# Patient Record
Sex: FEMALE | Race: WHITE | Employment: FULL TIME | URBAN - METROPOLITAN AREA
[De-identification: names, ages, dates, MRNs, and addresses within clinical notes are randomized per-mention and may not be internally consistent; named-entity substitution may affect disease eponyms.]

---

## 2017-07-05 ENCOUNTER — APPOINTMENT (OUTPATIENT)
Dept: GENERAL RADIOLOGY | Age: 42
End: 2017-07-05
Attending: NURSE PRACTITIONER
Payer: SELF-PAY

## 2017-07-05 ENCOUNTER — HOSPITAL ENCOUNTER (EMERGENCY)
Age: 42
Discharge: HOME | End: 2017-07-05
Attending: EMERGENCY MEDICINE
Payer: SELF-PAY

## 2017-07-05 VITALS
HEART RATE: 64 BPM | DIASTOLIC BLOOD PRESSURE: 51 MMHG | WEIGHT: 175 LBS | BODY MASS INDEX: 31.01 KG/M2 | RESPIRATION RATE: 16 BRPM | SYSTOLIC BLOOD PRESSURE: 116 MMHG | TEMPERATURE: 98.4 F | HEIGHT: 63 IN | OXYGEN SATURATION: 99 %

## 2017-07-05 DIAGNOSIS — L03.115 CELLULITIS OF RIGHT LOWER EXTREMITY: Primary | ICD-10-CM

## 2017-07-05 PROCEDURE — 73610 X-RAY EXAM OF ANKLE: CPT

## 2017-07-05 PROCEDURE — 74011250637 HC RX REV CODE- 250/637: Performed by: NURSE PRACTITIONER

## 2017-07-05 PROCEDURE — 73630 X-RAY EXAM OF FOOT: CPT

## 2017-07-05 PROCEDURE — 99283 EMERGENCY DEPT VISIT LOW MDM: CPT | Performed by: NURSE PRACTITIONER

## 2017-07-05 RX ORDER — CEPHALEXIN 500 MG/1
500 CAPSULE ORAL 4 TIMES DAILY
Qty: 28 CAP | Refills: 0 | Status: SHIPPED | OUTPATIENT
Start: 2017-07-05 | End: 2017-07-08

## 2017-07-05 RX ORDER — TRAMADOL HYDROCHLORIDE 50 MG/1
50 TABLET ORAL
Status: COMPLETED | OUTPATIENT
Start: 2017-07-05 | End: 2017-07-05

## 2017-07-05 RX ADMIN — TRAMADOL HYDROCHLORIDE 50 MG: 50 TABLET, FILM COATED ORAL at 18:34

## 2017-07-05 NOTE — ED TRIAGE NOTES
Pt c/o right foot pain x 2 days. Pt states she woke with the foot pain. Pt denies any trauma to the area. States today she is unable to stand. No obvious swelling or deformity noted at this time. Pt alert and oriented x 4. Respirations are even and unlabored. Pt appears in no acute distress at this time.

## 2017-07-05 NOTE — DISCHARGE INSTRUCTIONS

## 2017-07-05 NOTE — ED PROVIDER NOTES
HPI Comments: Patient presents with pain and redness to her right foot. She denies injury. She has noted redness to her right foot. Patient states pain increase with movement and contact. Patient is a 46 y.o. female presenting with foot pain. The history is provided by the patient. Foot Pain    This is a new problem. The current episode started 2 days ago. The problem occurs constantly. The problem has been gradually worsening. The pain is present in the right foot. The quality of the pain is described as aching. The pain is at a severity of 7/10. The pain is mild. Pertinent negatives include no numbness, full range of motion, no stiffness, no tingling, no itching, no back pain and no neck pain. The symptoms are aggravated by standing, movement and palpation. She has tried nothing for the symptoms. There has been no history of extremity trauma. Past Medical History:   Diagnosis Date    Cancer [facility])     breast    Ill-defined condition     ovarian cysts    Seizures (HCC)        Past Surgical History:   Procedure Laterality Date    HX CHOLECYSTECTOMY      HX GYN      c/s-bladder tac-partial hysterectomy         History reviewed. No pertinent family history. Social History     Social History    Marital status: SINGLE     Spouse name: N/A    Number of children: N/A    Years of education: N/A     Occupational History    Not on file. Social History Main Topics    Smoking status: Current Every Day Smoker     Packs/day: 0.50    Smokeless tobacco: Not on file    Alcohol use No    Drug use: No    Sexual activity: Not on file     Other Topics Concern    Not on file     Social History Narrative    ** Merged History Encounter **              ALLERGIES: Motrin [ibuprofen] and Tylenol [acetaminophen]    Review of Systems   Constitutional: Negative for chills and fever. Respiratory: Negative for cough and shortness of breath. Cardiovascular: Negative for chest pain.    Gastrointestinal: Negative for abdominal pain. Musculoskeletal: Positive for arthralgias and joint swelling. Negative for back pain, neck pain and stiffness. Skin: Positive for color change. Negative for itching. Neurological: Negative for tingling and numbness. Vitals:    07/05/17 1556   BP: 128/55   Pulse: 78   Resp: 17   Temp: 98 °F (36.7 °C)   SpO2: 98%   Weight: 79.4 kg (175 lb)   Height: 5' 3\" (1.6 m)            Physical Exam   Constitutional: She is oriented to person, place, and time. She appears well-developed and well-nourished. No distress. Cardiovascular: Normal rate and regular rhythm. No murmur heard. Pulmonary/Chest: Effort normal and breath sounds normal.   Musculoskeletal:        Right ankle: She exhibits swelling. She exhibits no deformity, no laceration and normal pulse. Tenderness. Lateral malleolus tenderness found. Right lower leg: Normal.        Right foot: There is tenderness and swelling. There is normal capillary refill, no deformity and no laceration. Neurological: She is alert and oriented to person, place, and time. Skin: Skin is warm and dry. She is not diaphoretic. There is erythema. Psychiatric: She has a normal mood and affect. Her behavior is normal.   Nursing note and vitals reviewed.       XR ANKLE RT MIN 3 V (Final result) Result time: 07/05/17 18:07:33     Final result by Zacarias Seip, MD (07/05/17 18:07:33)     Impression:     IMPRESSION: No acute osseous abnormality.     Narrative:     Right ankle  Right foot    Clinical indication: Acute moderate to severe swelling and pain right ankle and  right foot with difficulty walking, no known injury     Comparison: none    Technique: 3 views right foot, 3 views right ankle    Findings: The bone density is overall normal. There are no abnormal radiopaque  foreign bodies or dissecting subcutaneous gas.     Right foot: There is no evidence of fracture, dislocation, or erosion.  Overall  preservation of joint spaces and alignment. Small areas of spurring and cortical  irregularity of the calcaneus are chronic in appearance, possibly due to  previous injury or inflammation. Right ankle: No evidence of fracture, dislocation, erosion, or focal aggressive  osseous lesion. Mild soft tissue swelling.                   XR FOOT RT MIN 3 V (Final result) Result time: 07/05/17 18:07:33     Final result by Fani Patino MD (07/05/17 18:07:33)     Impression:     IMPRESSION: No acute osseous abnormality.     Narrative:     Right ankle  Right foot    Clinical indication: Acute moderate to severe swelling and pain right ankle and  right foot with difficulty walking, no known injury     Comparison: none    Technique: 3 views right foot, 3 views right ankle    Findings: The bone density is overall normal. There are no abnormal radiopaque  foreign bodies or dissecting subcutaneous gas. Right foot: There is no evidence of fracture, dislocation, or erosion.  Overall  preservation of joint spaces and alignment. Small areas of spurring and cortical  irregularity of the calcaneus are chronic in appearance, possibly due to  previous injury or inflammation. Right ankle: No evidence of fracture, dislocation, erosion, or focal aggressive  osseous lesion. Mild soft tissue swelling. MDM  Number of Diagnoses or Management Options  Cellulitis of right lower extremity:   Diagnosis management comments: No acute abnormality noted on xray. Patient given prescription for keflex.         Amount and/or Complexity of Data Reviewed  Tests in the radiology section of CPT®: ordered and reviewed    Patient Progress  Patient progress: stable    ED Course       Procedures

## 2017-07-05 NOTE — LETTER
[address] [facility] - [place], [place] EMERGENCY DEPT One [address] [id number] 
[phone] Work/School Note Date: 7/5/2017 To Whom It May concern: 
 
Whitney Sanchez was seen and treated today in the emergency room by the following provider(s): 
Attending Provider: Colton Solitario MD 
Nurse Practitioner: FIGUEROA Villar. Whitney Sanchez may return to work on Friday, July 07, 2017.  
 
Sincerely, 
 
 
 
 
Cozetta Rinne, RN

## 2017-07-08 ENCOUNTER — HOSPITAL ENCOUNTER (EMERGENCY)
Age: 42
Discharge: HOME | End: 2017-07-08
Attending: EMERGENCY MEDICINE
Payer: SELF-PAY

## 2017-07-08 VITALS
SYSTOLIC BLOOD PRESSURE: 142 MMHG | DIASTOLIC BLOOD PRESSURE: 70 MMHG | OXYGEN SATURATION: 98 % | TEMPERATURE: 98.5 F | HEART RATE: 92 BPM | WEIGHT: 170 LBS | BODY MASS INDEX: 30.12 KG/M2 | RESPIRATION RATE: 18 BRPM | HEIGHT: 63 IN

## 2017-07-08 DIAGNOSIS — L03.90 CELLULITIS, UNSPECIFIED CELLULITIS SITE: Primary | ICD-10-CM

## 2017-07-08 LAB
ALBUMIN SERPL BCP-MCNC: 2.9 G/DL (ref 3.5–5)
ALBUMIN/GLOB SERPL: 0.5 {RATIO} (ref 1.2–3.5)
ALP SERPL-CCNC: 198 U/L (ref 50–136)
ALT SERPL-CCNC: 95 U/L (ref 12–65)
ANION GAP BLD CALC-SCNC: 10 MMOL/L (ref 7–16)
AST SERPL W P-5'-P-CCNC: 111 U/L (ref 15–37)
BASOPHILS # BLD AUTO: 0 K/UL (ref 0–0.2)
BASOPHILS # BLD: 0 % (ref 0–2)
BILIRUB SERPL-MCNC: 1.6 MG/DL (ref 0.2–1.1)
BUN SERPL-MCNC: 11 MG/DL (ref 6–23)
CALCIUM SERPL-MCNC: 8.6 MG/DL (ref 8.3–10.4)
CHLORIDE SERPL-SCNC: 104 MMOL/L (ref 98–107)
CO2 SERPL-SCNC: 25 MMOL/L (ref 21–32)
CREAT SERPL-MCNC: 0.79 MG/DL (ref 0.6–1)
DIFFERENTIAL METHOD BLD: ABNORMAL
EOSINOPHIL # BLD: 0 K/UL (ref 0–0.8)
EOSINOPHIL NFR BLD: 1 % (ref 0.5–7.8)
ERYTHROCYTE [DISTWIDTH] IN BLOOD BY AUTOMATED COUNT: 14.9 % (ref 11.9–14.6)
GLOBULIN SER CALC-MCNC: 6.2 G/DL (ref 2.3–3.5)
GLUCOSE SERPL-MCNC: 165 MG/DL (ref 65–100)
HCT VFR BLD AUTO: 36.7 % (ref 35.8–46.3)
HGB BLD-MCNC: 12.2 G/DL (ref 11.7–15.4)
IMM GRANULOCYTES # BLD: 0 K/UL (ref 0–0.5)
IMM GRANULOCYTES NFR BLD AUTO: 0.2 % (ref 0–5)
LACTATE BLD-SCNC: 1.5 MMOL/L (ref 0.5–1.9)
LACTATE BLD-SCNC: 2.5 MMOL/L (ref 0.5–1.9)
LYMPHOCYTES # BLD AUTO: 40 % (ref 13–44)
LYMPHOCYTES # BLD: 2.1 K/UL (ref 0.5–4.6)
MCH RBC QN AUTO: 28.2 PG (ref 26.1–32.9)
MCHC RBC AUTO-ENTMCNC: 33.2 G/DL (ref 31.4–35)
MCV RBC AUTO: 85 FL (ref 79.6–97.8)
MONOCYTES # BLD: 0.6 K/UL (ref 0.1–1.3)
MONOCYTES NFR BLD AUTO: 11 % (ref 4–12)
NEUTS SEG # BLD: 2.5 K/UL (ref 1.7–8.2)
NEUTS SEG NFR BLD AUTO: 48 % (ref 43–78)
PLATELET # BLD AUTO: 105 K/UL (ref 150–450)
PMV BLD AUTO: 13 FL (ref 10.8–14.1)
POTASSIUM SERPL-SCNC: 3.1 MMOL/L (ref 3.5–5.1)
PROT SERPL-MCNC: 9.1 G/DL (ref 6.3–8.2)
RBC # BLD AUTO: 4.32 M/UL (ref 4.05–5.25)
SODIUM SERPL-SCNC: 139 MMOL/L (ref 136–145)
URATE SERPL-MCNC: 4.7 MG/DL (ref 2.6–6)
WBC # BLD AUTO: 5.2 K/UL (ref 4.3–11.1)

## 2017-07-08 PROCEDURE — 83605 ASSAY OF LACTIC ACID: CPT

## 2017-07-08 PROCEDURE — 96375 TX/PRO/DX INJ NEW DRUG ADDON: CPT | Performed by: PHYSICIAN ASSISTANT

## 2017-07-08 PROCEDURE — 96365 THER/PROPH/DIAG IV INF INIT: CPT | Performed by: PHYSICIAN ASSISTANT

## 2017-07-08 PROCEDURE — 96372 THER/PROPH/DIAG INJ SC/IM: CPT | Performed by: PHYSICIAN ASSISTANT

## 2017-07-08 PROCEDURE — 80053 COMPREHEN METABOLIC PANEL: CPT | Performed by: EMERGENCY MEDICINE

## 2017-07-08 PROCEDURE — 84550 ASSAY OF BLOOD/URIC ACID: CPT | Performed by: EMERGENCY MEDICINE

## 2017-07-08 PROCEDURE — 99284 EMERGENCY DEPT VISIT MOD MDM: CPT | Performed by: PHYSICIAN ASSISTANT

## 2017-07-08 PROCEDURE — 85025 COMPLETE CBC W/AUTO DIFF WBC: CPT | Performed by: EMERGENCY MEDICINE

## 2017-07-08 PROCEDURE — 74011250636 HC RX REV CODE- 250/636: Performed by: PHYSICIAN ASSISTANT

## 2017-07-08 RX ORDER — CLINDAMYCIN PHOSPHATE 900 MG/50ML
900 INJECTION INTRAVENOUS
Status: COMPLETED | OUTPATIENT
Start: 2017-07-08 | End: 2017-07-08

## 2017-07-08 RX ORDER — HYDROCODONE BITARTRATE AND IBUPROFEN 5; 200 MG/1; MG/1
1 TABLET ORAL
Qty: 10 TAB | Refills: 0 | Status: SHIPPED | OUTPATIENT
Start: 2017-07-08

## 2017-07-08 RX ORDER — CLINDAMYCIN HYDROCHLORIDE 150 MG/1
300 CAPSULE ORAL EVERY 6 HOURS
Qty: 80 CAP | Refills: 0 | Status: SHIPPED | OUTPATIENT
Start: 2017-07-08 | End: 2017-07-18

## 2017-07-08 RX ORDER — HYDROMORPHONE HYDROCHLORIDE 1 MG/ML
1 INJECTION, SOLUTION INTRAMUSCULAR; INTRAVENOUS; SUBCUTANEOUS
Status: COMPLETED | OUTPATIENT
Start: 2017-07-08 | End: 2017-07-08

## 2017-07-08 RX ORDER — KETOROLAC TROMETHAMINE 30 MG/ML
30 INJECTION, SOLUTION INTRAMUSCULAR; INTRAVENOUS
Status: DISCONTINUED | OUTPATIENT
Start: 2017-07-08 | End: 2017-07-08

## 2017-07-08 RX ORDER — ONDANSETRON 2 MG/ML
4 INJECTION INTRAMUSCULAR; INTRAVENOUS
Status: COMPLETED | OUTPATIENT
Start: 2017-07-08 | End: 2017-07-08

## 2017-07-08 RX ORDER — KETOROLAC TROMETHAMINE 30 MG/ML
60 INJECTION, SOLUTION INTRAMUSCULAR; INTRAVENOUS
Status: COMPLETED | OUTPATIENT
Start: 2017-07-08 | End: 2017-07-08

## 2017-07-08 RX ORDER — PROMETHAZINE HYDROCHLORIDE 25 MG/ML
25 INJECTION, SOLUTION INTRAMUSCULAR; INTRAVENOUS
Status: COMPLETED | OUTPATIENT
Start: 2017-07-08 | End: 2017-07-08

## 2017-07-08 RX ADMIN — SODIUM CHLORIDE 1000 ML: 900 INJECTION, SOLUTION INTRAVENOUS at 18:55

## 2017-07-08 RX ADMIN — CLINDAMYCIN PHOSPHATE 900 MG: 18 INJECTION, SOLUTION INTRAMUSCULAR; INTRAVENOUS at 18:59

## 2017-07-08 RX ADMIN — PROMETHAZINE HYDROCHLORIDE 25 MG: 25 INJECTION INTRAMUSCULAR; INTRAVENOUS at 16:51

## 2017-07-08 RX ADMIN — KETOROLAC TROMETHAMINE 60 MG: 30 INJECTION, SOLUTION INTRAMUSCULAR at 16:51

## 2017-07-08 RX ADMIN — HYDROMORPHONE HYDROCHLORIDE 1 MG: 1 INJECTION, SOLUTION INTRAMUSCULAR; INTRAVENOUS; SUBCUTANEOUS at 18:55

## 2017-07-08 RX ADMIN — ONDANSETRON 4 MG: 2 INJECTION INTRAMUSCULAR; INTRAVENOUS at 18:55

## 2017-07-08 NOTE — DISCHARGE INSTRUCTIONS

## 2017-07-08 NOTE — ED TRIAGE NOTES
Pt. With swelling/redness to right foot/ankle. Here 3 days ago and treated for the same cellulitis. States the swelling has increased, despite being on abx.

## 2017-07-08 NOTE — ED PROVIDER NOTES
HPI Comments: Patient states that she woke up on July 5, 3 days ago with pain,, swelling and redness to her right ankle. She came to the emergency room, had an x-ray and was told that it appeared to be cellulitis. She was started on Keflex which she states she has been taking. The pain, redness and swelling has gotten worse. She has been taking the antibiotics as instructed. No fever. She does not have a history of gout. No chest pain, shortness of breath, abdominal pain, nausea, vomiting or injury to the area. No swelling of the leg. Patient does have a history of MRSA. Patient is tearful because of the pain. Patient is a 46 y.o. female presenting with foot swelling. The history is provided by the patient. Foot Swelling    This is a new problem. Episode onset: 07/05/17, 3 days now. The problem occurs constantly. The problem has been gradually worsening. The pain is present in the right foot. The quality of the pain is described as aching. The pain is at a severity of 7/10. The pain is moderate. Associated symptoms include limited range of motion and stiffness. Pertinent negatives include no numbness, no tingling, no itching, no back pain and no neck pain. The symptoms are aggravated by movement, activity and standing. She has tried nothing for the symptoms. There has been no history of extremity trauma. Past Medical History:   Diagnosis Date    Cancer [facility])     breast    Ill-defined condition     ovarian cysts    Seizures (HCC)        Past Surgical History:   Procedure Laterality Date    HX CHOLECYSTECTOMY      HX GYN      c/s-bladder tac-partial hysterectomy         No family history on file. Social History     Social History    Marital status: SINGLE     Spouse name: N/A    Number of children: N/A    Years of education: N/A     Occupational History    Not on file.      Social History Main Topics    Smoking status: Current Every Day Smoker     Packs/day: 0.50    Smokeless tobacco: Never Used    Alcohol use No    Drug use: No    Sexual activity: Not on file     Other Topics Concern    Not on file     Social History Narrative    ** Merged History Encounter **              ALLERGIES: Motrin [ibuprofen] and Tylenol [acetaminophen]    Review of Systems   Constitutional: Negative. HENT: Negative. Eyes: Negative. Respiratory: Negative. Cardiovascular: Negative. Gastrointestinal: Negative. Genitourinary: Negative. Musculoskeletal: Positive for stiffness. Negative for back pain and neck pain. Right ankle pain   Skin: Negative. Negative for itching. Neurological: Negative. Negative for tingling and numbness. Psychiatric/Behavioral: Negative. All other systems reviewed and are negative. Vitals:    07/08/17 1612   BP: 152/65   Pulse: (!) 107   Resp: 18   Temp: 98.3 °F (36.8 °C)   SpO2: 99%   Weight: 77.1 kg (170 lb)   Height: 5' 3\" (1.6 m)            Physical Exam   Constitutional: She is oriented to person, place, and time. She appears well-developed and well-nourished. HENT:   Head: Normocephalic and atraumatic. Right Ear: External ear normal.   Left Ear: External ear normal.   Nose: Nose normal.   Mouth/Throat: Oropharynx is clear and moist.   Eyes: Conjunctivae and EOM are normal. Pupils are equal, round, and reactive to light. Neck: Normal range of motion. Neck supple. Cardiovascular: Normal rate, regular rhythm, normal heart sounds and intact distal pulses. Pulmonary/Chest: Effort normal and breath sounds normal.   Abdominal: Soft. Bowel sounds are normal.   Musculoskeletal:        Right ankle: Tenderness. Feet:    Neurological: She is alert and oriented to person, place, and time. She has normal reflexes. Skin: Skin is warm and dry. Psychiatric: She has a normal mood and affect. Her behavior is normal. Judgment and thought content normal.   Nursing note and vitals reviewed.        MDM  Number of Diagnoses or Management Options Amount and/or Complexity of Data Reviewed  Clinical lab tests: ordered and reviewed  Tests in the radiology section of CPT®: reviewed (XR from 07/05/17)    Risk of Complications, Morbidity, and/or Mortality  Presenting problems: moderate  Diagnostic procedures: moderate  Management options: moderate    Patient Progress  Patient progress: stable    ED Course       Procedures      4:34 PM Spoke with Dr. Sara Garcia regarding patient. 6:12 PM Spoke with Dr. Timo Hu regarding patient. 6:23 PM Spoke with patient, she will consent to IVF, IV clindamycin and IV pain medication. We will repeat a lactic acid after the IVF. If better, since she is not vomiting, she can go home. She does not have insurance and can fill the clindamycin at [facility] for $4. Return to the ED if worse. The patient was observed in the ED. Results Reviewed:      Recent Results (from the past 24 hour(s))   CBC WITH AUTOMATED DIFF    Collection Time: 07/08/17  4:49 PM   Result Value Ref Range    WBC 5.2 4.3 - 11.1 K/uL    RBC 4.32 4.05 - 5.25 M/uL    HGB 12.2 11.7 - 15.4 g/dL    HCT 36.7 35.8 - 46.3 %    MCV 85.0 79.6 - 97.8 FL    MCH 28.2 26.1 - 32.9 PG    MCHC 33.2 31.4 - 35.0 g/dL    RDW 14.9 (H) 11.9 - 14.6 %    PLATELET 596 (L) 412 - 450 K/uL    MPV 13.0 10.8 - 14.1 FL    DF AUTOMATED      NEUTROPHILS 48 43 - 78 %    LYMPHOCYTES 40 13 - 44 %    MONOCYTES 11 4.0 - 12.0 %    EOSINOPHILS 1 0.5 - 7.8 %    BASOPHILS 0 0.0 - 2.0 %    IMMATURE GRANULOCYTES 0.2 0.0 - 5.0 %    ABS. NEUTROPHILS 2.5 1.7 - 8.2 K/UL    ABS. LYMPHOCYTES 2.1 0.5 - 4.6 K/UL    ABS. MONOCYTES 0.6 0.1 - 1.3 K/UL    ABS. EOSINOPHILS 0.0 0.0 - 0.8 K/UL    ABS. BASOPHILS 0.0 0.0 - 0.2 K/UL    ABS. IMM.  GRANS. 0.0 0.0 - 0.5 K/UL   METABOLIC PANEL, COMPREHENSIVE    Collection Time: 07/08/17  4:49 PM   Result Value Ref Range    Sodium 139 136 - 145 mmol/L    Potassium 3.1 (L) 3.5 - 5.1 mmol/L    Chloride 104 98 - 107 mmol/L    CO2 25 21 - 32 mmol/L    Anion gap 10 7 - 16 mmol/L    Glucose 165 (H) 65 - 100 mg/dL    BUN 11 6 - 23 MG/DL    Creatinine 0.79 0.6 - 1.0 MG/DL    GFR est AA >60 >60 ml/min/1.73m2    GFR est non-AA >60 >60 ml/min/1.73m2    Calcium 8.6 8.3 - 10.4 MG/DL    Bilirubin, total 1.6 (H) 0.2 - 1.1 MG/DL    ALT (SGPT) 95 (H) 12 - 65 U/L    AST (SGOT) 111 (H) 15 - 37 U/L    Alk. phosphatase 198 (H) 50 - 136 U/L    Protein, total 9.1 (H) 6.3 - 8.2 g/dL    Albumin 2.9 (L) 3.5 - 5.0 g/dL    Globulin 6.2 (H) 2.3 - 3.5 g/dL    A-G Ratio 0.5 (L) 1.2 - 3.5     URIC ACID    Collection Time: 07/08/17  4:49 PM   Result Value Ref Range    Uric acid 4.7 2.6 - 6.0 MG/DL   POC LACTIC ACID    Collection Time: 07/08/17  5:02 PM   Result Value Ref Range    Lactic Acid (POC) 2.5 (H) 0.5 - 1.9 mmol/L     7:01 PM Care transferred to Dr. Karen Ruiz at shift change. He will recheck POC lactic acid after IVF, if ok, she will go home. I discussed the results of all labs, procedures, radiographs, and treatments with the patient and available family. Treatment plan is agreed upon and the patient is ready for discharge. All voiced understanding of the discharge plan and medication instructions or changes as appropriate. Questions about treatment in the ED were answered. All were encouraged to return should symptoms worsen or new problems develop.

## 2017-07-08 NOTE — Clinical Note
Drink plenty of fluids, start the clindamycin tonight and finish all of it. Return to the ED if worse.

## 2017-07-09 ENCOUNTER — HOSPITAL ENCOUNTER (EMERGENCY)
Age: 42
Discharge: HOME | End: 2017-07-10
Attending: EMERGENCY MEDICINE
Payer: SELF-PAY

## 2017-07-09 DIAGNOSIS — L03.115 CELLULITIS OF RIGHT LOWER EXTREMITY: Primary | ICD-10-CM

## 2017-07-09 LAB
ANION GAP BLD CALC-SCNC: 8 MMOL/L (ref 7–16)
BASOPHILS # BLD AUTO: 0 K/UL (ref 0–0.2)
BASOPHILS # BLD: 0 % (ref 0–2)
BUN SERPL-MCNC: 9 MG/DL (ref 6–23)
CALCIUM SERPL-MCNC: 8.4 MG/DL (ref 8.3–10.4)
CHLORIDE SERPL-SCNC: 101 MMOL/L (ref 98–107)
CO2 SERPL-SCNC: 29 MMOL/L (ref 21–32)
CREAT SERPL-MCNC: 0.74 MG/DL (ref 0.6–1)
DIFFERENTIAL METHOD BLD: ABNORMAL
EOSINOPHIL # BLD: 0 K/UL (ref 0–0.8)
EOSINOPHIL NFR BLD: 1 % (ref 0.5–7.8)
ERYTHROCYTE [DISTWIDTH] IN BLOOD BY AUTOMATED COUNT: 14.7 % (ref 11.9–14.6)
GLUCOSE SERPL-MCNC: 93 MG/DL (ref 65–100)
HCT VFR BLD AUTO: 34.5 % (ref 35.8–46.3)
HGB BLD-MCNC: 11.7 G/DL (ref 11.7–15.4)
IMM GRANULOCYTES # BLD: 0 K/UL (ref 0–0.5)
IMM GRANULOCYTES NFR BLD AUTO: 0 % (ref 0–5)
LYMPHOCYTES # BLD AUTO: 41 % (ref 13–44)
LYMPHOCYTES # BLD: 1.6 K/UL (ref 0.5–4.6)
MCH RBC QN AUTO: 28.8 PG (ref 26.1–32.9)
MCHC RBC AUTO-ENTMCNC: 33.9 G/DL (ref 31.4–35)
MCV RBC AUTO: 85 FL (ref 79.6–97.8)
MONOCYTES # BLD: 0.3 K/UL (ref 0.1–1.3)
MONOCYTES NFR BLD AUTO: 8 % (ref 4–12)
NEUTS SEG # BLD: 1.9 K/UL (ref 1.7–8.2)
NEUTS SEG NFR BLD AUTO: 50 % (ref 43–78)
PLATELET # BLD AUTO: 84 K/UL (ref 150–450)
PMV BLD AUTO: 11.5 FL (ref 10.8–14.1)
POTASSIUM SERPL-SCNC: 3.7 MMOL/L (ref 3.5–5.1)
RBC # BLD AUTO: 4.06 M/UL (ref 4.05–5.25)
SODIUM SERPL-SCNC: 138 MMOL/L (ref 136–145)
WBC # BLD AUTO: 3.8 K/UL (ref 4.3–11.1)

## 2017-07-09 PROCEDURE — 87040 BLOOD CULTURE FOR BACTERIA: CPT | Performed by: EMERGENCY MEDICINE

## 2017-07-09 PROCEDURE — 74011250636 HC RX REV CODE- 250/636: Performed by: EMERGENCY MEDICINE

## 2017-07-09 PROCEDURE — 96375 TX/PRO/DX INJ NEW DRUG ADDON: CPT | Performed by: EMERGENCY MEDICINE

## 2017-07-09 PROCEDURE — 96365 THER/PROPH/DIAG IV INF INIT: CPT | Performed by: EMERGENCY MEDICINE

## 2017-07-09 PROCEDURE — 99283 EMERGENCY DEPT VISIT LOW MDM: CPT | Performed by: EMERGENCY MEDICINE

## 2017-07-09 PROCEDURE — 96361 HYDRATE IV INFUSION ADD-ON: CPT | Performed by: EMERGENCY MEDICINE

## 2017-07-09 PROCEDURE — 85025 COMPLETE CBC W/AUTO DIFF WBC: CPT | Performed by: EMERGENCY MEDICINE

## 2017-07-09 PROCEDURE — 80048 BASIC METABOLIC PNL TOTAL CA: CPT | Performed by: EMERGENCY MEDICINE

## 2017-07-09 RX ORDER — HYDROMORPHONE HYDROCHLORIDE 1 MG/ML
0.5 INJECTION, SOLUTION INTRAMUSCULAR; INTRAVENOUS; SUBCUTANEOUS
Status: COMPLETED | OUTPATIENT
Start: 2017-07-09 | End: 2017-07-10

## 2017-07-09 RX ORDER — CLINDAMYCIN PHOSPHATE 900 MG/50ML
900 INJECTION INTRAVENOUS
Status: COMPLETED | OUTPATIENT
Start: 2017-07-09 | End: 2017-07-10

## 2017-07-09 RX ADMIN — SODIUM CHLORIDE 1000 ML: 900 INJECTION, SOLUTION INTRAVENOUS at 22:21

## 2017-07-09 NOTE — LETTER
[address] [facility] EMERGENCY DEPT One [address] [id number] [phone] Work/School Note Date: 7/9/2017 To Whom It May concern: 
 
Jessie Valladares was seen and treated today in the emergency room by the following provider(s): 
Attending Provider: Gustave Kanner, MD.   
 
Jessie Valladares Special Instructions: Work excuse next 2-3 days Sincerely, 
 
 
 
 
Gustave Kanner, MD

## 2017-07-10 VITALS
RESPIRATION RATE: 17 BRPM | SYSTOLIC BLOOD PRESSURE: 119 MMHG | HEART RATE: 71 BPM | TEMPERATURE: 99.1 F | DIASTOLIC BLOOD PRESSURE: 66 MMHG | WEIGHT: 170 LBS | OXYGEN SATURATION: 99 % | BODY MASS INDEX: 29.02 KG/M2 | HEIGHT: 64 IN

## 2017-07-10 PROCEDURE — 74011250636 HC RX REV CODE- 250/636: Performed by: EMERGENCY MEDICINE

## 2017-07-10 RX ADMIN — HYDROMORPHONE HYDROCHLORIDE 0.5 MG: 1 INJECTION, SOLUTION INTRAMUSCULAR; INTRAVENOUS; SUBCUTANEOUS at 00:28

## 2017-07-10 RX ADMIN — CLINDAMYCIN PHOSPHATE 900 MG: 18 INJECTION, SOLUTION INTRAMUSCULAR; INTRAVENOUS at 00:27

## 2017-07-10 NOTE — ED TRIAGE NOTES
patient states that she was given prescriptions here last night and they were too expensive for her to get filled. Also states the redness in her foot has gotten worse.

## 2017-07-10 NOTE — DISCHARGE INSTRUCTIONS

## 2017-07-10 NOTE — ED PROVIDER NOTES
HPI Comments: Here with some progressive swelling to the area that she was seen for yesterday. Unfortunately after leaving should not fill her prescriptions due to cost.  Foot is somewhat more swollen and slightly redder today than it was. Initial area of soreness as well as to the lateral fifth MTP region. She is not diabetic. No other sores or lesions present. Patient is a 46 y.o. female presenting with skin infection. The history is provided by the patient. Skin Infection   The current episode started more than 2 days ago. The problem occurs constantly. The problem has been gradually worsening. She has tried nothing (not able to fill prescription) for the symptoms. Past Medical History:   Diagnosis Date    Cancer [facility])     breast    Ill-defined condition     ovarian cysts    Seizures (HCC)        Past Surgical History:   Procedure Laterality Date    HX CHOLECYSTECTOMY      HX GYN      c/s-bladder tac-partial hysterectomy         History reviewed. No pertinent family history. Social History     Social History    Marital status: SINGLE     Spouse name: N/A    Number of children: N/A    Years of education: N/A     Occupational History    Not on file. Social History Main Topics    Smoking status: Current Every Day Smoker     Packs/day: 0.50    Smokeless tobacco: Never Used    Alcohol use No    Drug use: No    Sexual activity: Not on file     Other Topics Concern    Not on file     Social History Narrative    ** Merged History Encounter **              ALLERGIES: Motrin [ibuprofen] and Tylenol [acetaminophen]    Review of Systems   Constitutional: Positive for fatigue. Respiratory: Negative. Cardiovascular: Negative. Genitourinary: Negative. All other systems reviewed and are negative.       Vitals:    07/09/17 2140   BP: 128/73   Pulse: 82   Resp: 22   Temp: 99.8 °F (37.7 °C)   SpO2: 99%   Weight: 77.1 kg (170 lb)   Height: 5' 4\" (1.626 m)            Physical Exam Constitutional: She appears well-developed and well-nourished. No distress. Not toxic or septic   HENT:   Head: Atraumatic. Eyes: No scleral icterus. Neck: Neck supple. Cardiovascular: Normal rate. Pulmonary/Chest: Effort normal. No respiratory distress. Abdominal: Soft. Musculoskeletal: She exhibits edema. She exhibits no deformity. Neurological: She is alert. Skin: Skin is warm and dry. There is erythema. Pinkness to dorsum of right foot    Psychiatric: Thought content normal.   Nursing note and vitals reviewed. MDM  Number of Diagnoses or Management Options  Cellulitis of right lower extremity:   Diagnosis management comments: Soft tissue infection to the foot. subjective to patient somewhat worse in our department and actually after keeping it elevated getting IV antibiotics almost no visible changes are identified. Patient does not appear to be toxic or septic. Unfortunately was unable to fill her medications once again give her some IV antibiotics and also Her with our social workers. CBC normal.  Temperature low-grade       Amount and/or Complexity of Data Reviewed  Clinical lab tests: reviewed and ordered  Decide to obtain previous medical records or to obtain history from someone other than the patient: yes    Risk of Complications, Morbidity, and/or Mortality  Presenting problems: moderate  Diagnostic procedures: low  Management options: moderate    Patient Progress  Patient progress: stable    ED Course       Procedures

## 2017-07-14 LAB
BACTERIA SPEC CULT: NORMAL
BACTERIA SPEC CULT: NORMAL
SERVICE CMNT-IMP: NORMAL
SERVICE CMNT-IMP: NORMAL

## 2018-02-01 LAB
BASOPHILS # BLD: 0 K/UL (ref 0–0.2)
BASOPHILS NFR BLD: 0 % (ref 0–2)
DIFFERENTIAL METHOD BLD: ABNORMAL
EOSINOPHIL # BLD: 0 K/UL (ref 0–0.8)
EOSINOPHIL NFR BLD: 1 % (ref 0.5–7.8)
ERYTHROCYTE [DISTWIDTH] IN BLOOD BY AUTOMATED COUNT: 14.8 % (ref 11.9–14.6)
HCT VFR BLD AUTO: 31.8 % (ref 35.8–46.3)
HGB BLD-MCNC: 10.3 G/DL (ref 11.7–15.4)
IMM GRANULOCYTES # BLD: 0 K/UL (ref 0–0.5)
IMM GRANULOCYTES NFR BLD AUTO: 0 % (ref 0–5)
LYMPHOCYTES # BLD: 1.6 K/UL (ref 0.5–4.6)
LYMPHOCYTES NFR BLD: 59 % (ref 13–44)
MCH RBC QN AUTO: 28 PG (ref 26.1–32.9)
MCHC RBC AUTO-ENTMCNC: 32.4 G/DL (ref 31.4–35)
MCV RBC AUTO: 86.4 FL (ref 79.6–97.8)
MONOCYTES # BLD: 0.2 K/UL (ref 0.1–1.3)
MONOCYTES NFR BLD: 8 % (ref 4–12)
NEUTS SEG # BLD: 0.9 K/UL (ref 1.7–8.2)
NEUTS SEG NFR BLD: 32 % (ref 43–78)
PLATELET # BLD AUTO: 53 K/UL (ref 150–450)
PMV BLD AUTO: 12.3 FL (ref 10.8–14.1)
RBC # BLD AUTO: 3.68 M/UL (ref 4.05–5.25)
WBC # BLD AUTO: 2.8 K/UL (ref 4.3–11.1)

## 2018-02-01 PROCEDURE — 85025 COMPLETE CBC W/AUTO DIFF WBC: CPT

## 2018-02-01 PROCEDURE — 99284 EMERGENCY DEPT VISIT MOD MDM: CPT | Performed by: STUDENT IN AN ORGANIZED HEALTH CARE EDUCATION/TRAINING PROGRAM

## 2018-02-01 PROCEDURE — 80053 COMPREHEN METABOLIC PANEL: CPT

## 2018-02-02 ENCOUNTER — APPOINTMENT (OUTPATIENT)
Dept: ULTRASOUND IMAGING | Age: 43
End: 2018-02-02
Attending: STUDENT IN AN ORGANIZED HEALTH CARE EDUCATION/TRAINING PROGRAM
Payer: COMMERCIAL

## 2018-02-02 ENCOUNTER — HOSPITAL ENCOUNTER (EMERGENCY)
Age: 43
Discharge: HOME | End: 2018-02-02
Attending: STUDENT IN AN ORGANIZED HEALTH CARE EDUCATION/TRAINING PROGRAM
Payer: COMMERCIAL

## 2018-02-02 VITALS
WEIGHT: 173 LBS | SYSTOLIC BLOOD PRESSURE: 106 MMHG | HEIGHT: 63 IN | DIASTOLIC BLOOD PRESSURE: 60 MMHG | OXYGEN SATURATION: 97 % | BODY MASS INDEX: 30.65 KG/M2 | HEART RATE: 70 BPM | TEMPERATURE: 97.9 F | RESPIRATION RATE: 16 BRPM

## 2018-02-02 DIAGNOSIS — Z86.19 HISTORY OF HEPATITIS C: ICD-10-CM

## 2018-02-02 DIAGNOSIS — D69.6 THROMBOCYTOPENIA (HCC): Primary | ICD-10-CM

## 2018-02-02 DIAGNOSIS — R74.8 ELEVATED LIVER ENZYMES: ICD-10-CM

## 2018-02-02 LAB
ALBUMIN SERPL-MCNC: 2.7 G/DL (ref 3.5–5)
ALBUMIN/GLOB SERPL: 0.5 {RATIO} (ref 1.2–3.5)
ALP SERPL-CCNC: 185 U/L (ref 50–136)
ALT SERPL-CCNC: 101 U/L (ref 12–65)
ANION GAP SERPL CALC-SCNC: 10 MMOL/L (ref 7–16)
AST SERPL-CCNC: 113 U/L (ref 15–37)
BILIRUB SERPL-MCNC: 0.5 MG/DL (ref 0.2–1.1)
BUN SERPL-MCNC: 10 MG/DL (ref 6–23)
CALCIUM SERPL-MCNC: 8.5 MG/DL (ref 8.3–10.4)
CHLORIDE SERPL-SCNC: 109 MMOL/L (ref 98–107)
CO2 SERPL-SCNC: 23 MMOL/L (ref 21–32)
CREAT SERPL-MCNC: 0.52 MG/DL (ref 0.6–1)
GLOBULIN SER CALC-MCNC: 5.3 G/DL (ref 2.3–3.5)
GLUCOSE SERPL-MCNC: 104 MG/DL (ref 65–100)
INR PPP: 1.4
POTASSIUM SERPL-SCNC: 4.2 MMOL/L (ref 3.5–5.1)
PROT SERPL-MCNC: 8 G/DL (ref 6.3–8.2)
PROTHROMBIN TIME: 17 SEC (ref 11.5–14.5)
SODIUM SERPL-SCNC: 142 MMOL/L (ref 136–145)

## 2018-02-02 PROCEDURE — 76705 ECHO EXAM OF ABDOMEN: CPT

## 2018-02-02 PROCEDURE — 85610 PROTHROMBIN TIME: CPT | Performed by: STUDENT IN AN ORGANIZED HEALTH CARE EDUCATION/TRAINING PROGRAM

## 2018-02-02 PROCEDURE — 86900 BLOOD TYPING SEROLOGIC ABO: CPT

## 2018-02-02 NOTE — ED NOTES
I have reviewed discharge instructions with the patient. The patient verbalized understanding. Patient left ED via Discharge Method: wheelchair to Home with  Family. Opportunity for questions and clarification provided. Patient given 0 scripts. To continue your aftercare when you leave the hospital, you may receive an automated call from our care team to check in on how you are doing. This is a free service and part of our promise to provide the best care and service to meet your aftercare needs.  If you have questions, or wish to unsubscribe from this service please call [phone]. Thank you for Choosing our [facility] Emergency Department.

## 2018-02-02 NOTE — ED PROVIDER NOTES
HPI Comments: 27-year-old female patient presents to the emergency department with reports of low platelet count noted during blood draw earlier this week. Patient states she was seen by her primary care provider for worsening abdominal pain  And recurrent nosebleeds. She underwent blood draw  And her results revealed a low platelet count  Of 43 per patient report. Her doctor's office attempted to call her for several days and was  Unsuccessful in reaching patient. She states she was just notified today of these results and told to come to the emergency department for further evaluation. Patient reports occasional nosebleeds with blowing her nose. .  She denies any of the symptoms now. She is also noted  Rectal bleeding at times but has not noticed any bloody, black or dark appearing stool over the past week. She denies any lightheaded or dizzy symptoms. She reports occasional headaches but denies any symptoms at this time. She denies any changes in vision, nausea, vomiting,  Fever, chills, chest pain, shortness of breath or changes in bowel or bladder habits at this time. Patient does have a history of Hepatitis C for which she was partially treated but could not tolerate the therapy. Patient is a 38 y.o. female presenting with other event. The history is provided by the patient. No  was used. Other   This is a new problem. The current episode started more than 2 days ago. The problem occurs constantly. The problem has not changed since onset. Associated symptoms include abdominal pain. Pertinent negatives include no chest pain, no headaches and no shortness of breath. Nothing aggravates the symptoms. Nothing relieves the symptoms. She has tried nothing for the symptoms.         Past Medical History:   Diagnosis Date    Cancer [facility])     breast    Ill-defined condition     ovarian cysts    Seizures (HCC)        Past Surgical History:   Procedure Laterality Date    HX CHOLECYSTECTOMY      HX GYN      c/s-bladder tac-partial hysterectomy         No family history on file. Social History     Social History    Marital status: SINGLE     Spouse name: N/A    Number of children: N/A    Years of education: N/A     Occupational History    Not on file. Social History Main Topics    Smoking status: Current Every Day Smoker     Packs/day: 0.50    Smokeless tobacco: Never Used    Alcohol use No    Drug use: No    Sexual activity: Not on file     Other Topics Concern    Not on file     Social History Narrative    ** Merged History Encounter **              ALLERGIES: Motrin [ibuprofen] and Tylenol [acetaminophen]    Review of Systems   Constitutional: Negative for chills, diaphoresis and fever. HENT: Negative for congestion, sneezing and sore throat. Eyes: Negative for visual disturbance. Respiratory: Negative for cough, chest tightness, shortness of breath and wheezing. Cardiovascular: Negative for chest pain and leg swelling. Gastrointestinal: Positive for abdominal pain. Negative for blood in stool, diarrhea, nausea and vomiting. Endocrine: Negative for polyuria. Genitourinary: Negative for difficulty urinating, dysuria, flank pain, hematuria and urgency. Musculoskeletal: Negative for back pain, myalgias, neck pain and neck stiffness. Skin: Negative for color change and rash. Neurological: Negative for dizziness, syncope, speech difficulty, weakness, light-headedness, numbness and headaches. Psychiatric/Behavioral: Negative for behavioral problems. All other systems reviewed and are negative. Vitals:    02/01/18 2309   BP: 143/83   Pulse: 76   Resp: 16   Temp: 97.9 °F (36.6 °C)   SpO2: 100%   Weight: 78.5 kg (173 lb)   Height: 5' 3\" (1.6 m)            Physical Exam   Constitutional: She is oriented to person, place, and time. She appears well-developed and well-nourished. No distress. Alert and oriented to person, place and time.  No acute distress. Speaks in clear, fluent sentences. HENT:   Head: Normocephalic and atraumatic. Right Ear: External ear normal.   Left Ear: External ear normal.   Nose: Nose normal.   Mouth/Throat: Oropharynx is clear and moist.   No evidence of epistaxis or recent bleeding about the oropharynx. Eyes: Conjunctivae and EOM are normal. Pupils are equal, round, and reactive to light. Neck: Normal range of motion. Neck supple. No JVD present. No tracheal deviation present. Cardiovascular: Normal rate, regular rhythm, S1 normal, S2 normal, normal heart sounds and intact distal pulses. Exam reveals no gallop, no distant heart sounds and no friction rub. No murmur heard. Pulmonary/Chest: Effort normal and breath sounds normal. No accessory muscle usage or stridor. No tachypnea and no bradypnea. No respiratory distress. She has no decreased breath sounds. She has no wheezes. She has no rhonchi. She has no rales. She exhibits no tenderness. Clear to auscultation throughout. Abdominal: Soft. Normal appearance. She exhibits no distension and no mass. There is no hepatosplenomegaly, splenomegaly or hepatomegaly. There is tenderness in the right upper quadrant. There is no rigidity, no rebound, no guarding, no CVA tenderness, no tenderness at McBurney's point and negative Hunt's sign. abdomen is painful to palpation over the right upper quadrant. There is no significant distention. There is no pain over the lower abdominal quadrants. No pain over McBurney's point. Musculoskeletal: Normal range of motion. She exhibits no edema, tenderness or deformity. Neurological: She is alert and oriented to person, place, and time. No cranial nerve deficit. Skin: Skin is warm and dry. No rash noted. She is not diaphoretic. No visible rash or petechiae. Psychiatric: She has a normal mood and affect. Her behavior is normal.   Nursing note and vitals reviewed.        MDM  Number of Diagnoses or Management Options  Diagnosis management comments: ppatient's evaluation today reveals platelet count of 54. There is no evidence of active bleeding at this time. Her liver enzymes are elevated but actually appeared better than previous testing. She states her abdominal pain is worsened with by mouth intake and she has had some nausea and vomiting. I will obtain ultrasound imaging of the right upper quadrant to rule out liver pathology or retained gallstone.          Amount and/or Complexity of Data Reviewed  Clinical lab tests: ordered and reviewed  Tests in the radiology section of CPT®: ordered and reviewed  Tests in the medicine section of CPT®: ordered and reviewed  Discuss the patient with other providers: yes  Independent visualization of images, tracings, or specimens: yes    Risk of Complications, Morbidity, and/or Mortality  Presenting problems: moderate  Diagnostic procedures: low  Management options: moderate    Patient Progress  Patient progress: stable        ED Course       Procedures

## 2018-02-02 NOTE — LETTER
[address] [facility] EMERGENCY DEPT [address] [address] 
[phone] Work/School Note Date: 2/1/2018 To Whom It May concern: 
 
Lorene Gardner was seen and treated today in the emergency room by the following provider: 
Eugena Sandifer, DO And was discharged from the emergency department on 2/2/2018 at 0430. Lorene Gardner may return to work on 2/3/2018. Sincerely, Warner Garcia RN

## 2018-02-03 LAB
ABO + RH BLD: NORMAL
BLOOD GROUP ANTIBODIES SERPL: NORMAL
SPECIMEN EXP DATE BLD: NORMAL

## 2019-08-02 ENCOUNTER — HOSPITAL ENCOUNTER (EMERGENCY)
Age: 44
Discharge: HOME | End: 2019-08-02
Attending: EMERGENCY MEDICINE
Payer: COMMERCIAL

## 2019-08-02 ENCOUNTER — APPOINTMENT (OUTPATIENT)
Dept: GENERAL RADIOLOGY | Age: 44
End: 2019-08-02
Attending: EMERGENCY MEDICINE
Payer: COMMERCIAL

## 2019-08-02 VITALS
OXYGEN SATURATION: 97 % | HEART RATE: 65 BPM | BODY MASS INDEX: 31.01 KG/M2 | SYSTOLIC BLOOD PRESSURE: 103 MMHG | TEMPERATURE: 97.8 F | HEIGHT: 63 IN | WEIGHT: 175 LBS | DIASTOLIC BLOOD PRESSURE: 65 MMHG | RESPIRATION RATE: 16 BRPM

## 2019-08-02 DIAGNOSIS — R07.89 CHEST WALL PAIN: Primary | ICD-10-CM

## 2019-08-02 PROCEDURE — 99283 EMERGENCY DEPT VISIT LOW MDM: CPT | Performed by: EMERGENCY MEDICINE

## 2019-08-02 PROCEDURE — 71046 X-RAY EXAM CHEST 2 VIEWS: CPT

## 2019-08-02 NOTE — LETTER
[address] [facility] EMERGENCY DEPT 
[address] [facility] T[address] 
[phone] Work/School Note Date: 8/2/2019 To Whom It May concern: 
 
Gavin Singh was seen and treated today in the emergency room by the following provider(s): 
Attending Provider: Farzana Porras MD.   
 
Gavin Singh Special Instructions: Seen in emergency department this afternoon please excuse associated abscess Sincerely, 
 
 
 
 
Homero Newman MD
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
## 2019-08-02 NOTE — ED NOTES
I have reviewed discharge instructions with the patient. The patient verbalized understanding. Patient left ED via Discharge Method: ambulatory to Home with mother. Opportunity for questions and clarification provided. Patient given 0 scripts. Work note given. No e-sign. To continue your aftercare when you leave the hospital, you may receive an automated call from our care team to check in on how you are doing. This is a free service and part of our promise to provide the best care and service to meet your aftercare needs.  If you have questions, or wish to unsubscribe from this service please call [phone]. Thank you for Choosing our [facility] Emergency Department.
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
## 2020-01-08 ENCOUNTER — HOSPITAL ENCOUNTER (EMERGENCY)
Age: 45
Discharge: HOME | End: 2020-01-08
Attending: EMERGENCY MEDICINE
Payer: COMMERCIAL

## 2020-01-08 VITALS
OXYGEN SATURATION: 99 % | WEIGHT: 168 LBS | HEART RATE: 63 BPM | HEIGHT: 63 IN | TEMPERATURE: 98 F | BODY MASS INDEX: 29.77 KG/M2 | SYSTOLIC BLOOD PRESSURE: 153 MMHG | RESPIRATION RATE: 18 BRPM | DIASTOLIC BLOOD PRESSURE: 84 MMHG

## 2020-01-08 DIAGNOSIS — K04.7 DENTAL ABSCESS: Primary | ICD-10-CM

## 2020-01-08 PROCEDURE — 99283 EMERGENCY DEPT VISIT LOW MDM: CPT | Performed by: PHYSICIAN ASSISTANT

## 2020-01-08 PROCEDURE — 96365 THER/PROPH/DIAG IV INF INIT: CPT | Performed by: PHYSICIAN ASSISTANT

## 2020-01-08 PROCEDURE — 74011000258 HC RX REV CODE- 258: Performed by: PHYSICIAN ASSISTANT

## 2020-01-08 PROCEDURE — 74011250636 HC RX REV CODE- 250/636: Performed by: PHYSICIAN ASSISTANT

## 2020-01-08 RX ORDER — TRAMADOL HYDROCHLORIDE 50 MG/1
50 TABLET ORAL
Qty: 9 TAB | Refills: 0 | Status: SHIPPED | OUTPATIENT
Start: 2020-01-08 | End: 2020-01-11

## 2020-01-08 RX ORDER — PENICILLIN V POTASSIUM 500 MG/1
500 TABLET, FILM COATED ORAL 4 TIMES DAILY
Qty: 28 TAB | Refills: 0 | Status: SHIPPED | OUTPATIENT
Start: 2020-01-08 | End: 2020-01-15

## 2020-01-08 RX ADMIN — CEFTRIAXONE 1 G: 1 INJECTION, POWDER, FOR SOLUTION INTRAMUSCULAR; INTRAVENOUS at 15:05

## 2020-01-08 NOTE — ED NOTES
I have reviewed discharge instructions with the patient. The patient verbalized understanding. Patient left ED via Discharge Method: ambulatory to Home with (insert name of family/friend, self, transport). Opportunity for questions and clarification provided. Patient given 1 scripts. To continue your aftercare when you leave the hospital, you may receive an automated call from our care team to check in on how you are doing. This is a free service and part of our promise to provide the best care and service to meet your aftercare needs.  If you have questions, or wish to unsubscribe from this service please call [phone]. Thank you for Choosing our [facility] Emergency Department.

## 2020-01-08 NOTE — LETTER
[address] [facility] EMERGENCY DEPT 
[address] [facility] 
[phone] Work/School Note Date: 1/8/2020 To Whom It May concern: 
 
Anmol Mendez was seen and treated today in the emergency room by the following provider(s): 
Attending Provider: Colubma Martinez MD 
Physician Assistant: KAITLIN Sebastian.   
 
Anmol Mendez may return to work on 1/9/2020.  
 
Sincerely,

## 2020-01-08 NOTE — ED PROVIDER NOTES
States left upper tooth broke several weeks ago. She started having mild pain to the area over the weekend but over the past few days noticed increased swelling to the left side of the face. No fever. She had other teeth removed in [facility]  but no dental insurance or local dentist    The history is provided by the patient. Dental Problem    This is a new problem. The current episode started 2 days ago. The problem occurs constantly. The problem has been gradually worsening. The pain is located in the left upper mouth. The quality of the pain is aching. The pain is at a severity of 10/10. The pain is moderate. Associated symptoms include swelling. She has tried nothing for the symptoms. The treatment provided no relief. The patient has no cardiac history. Past Medical History:   Diagnosis Date    Cancer [facility])     breast    Ill-defined condition     ovarian cysts    Seizures (HCC)        Past Surgical History:   Procedure Laterality Date    HX CHOLECYSTECTOMY      HX GYN      c/s-bladder tac-partial hysterectomy         No family history on file.     Social History     Socioeconomic History    Marital status: SINGLE     Spouse name: Not on file    Number of children: Not on file    Years of education: Not on file    Highest education level: Not on file   Occupational History    Not on file   Social Needs    Financial resource strain: Not on file    Food insecurity:     Worry: Not on file     Inability: Not on file    Transportation needs:     Medical: Not on file     Non-medical: Not on file   Tobacco Use    Smoking status: Current Every Day Smoker     Packs/day: 0.50    Smokeless tobacco: Never Used   Substance and Sexual Activity    Alcohol use: No    Drug use: No    Sexual activity: Not on file   Lifestyle    Physical activity:     Days per week: Not on file     Minutes per session: Not on file    Stress: Not on file   Relationships    Social connections:     Talks on phone: Not on file     Gets together: Not on file     Attends Denominational service: Not on file     Active member of club or organization: Not on file     Attends meetings of clubs or organizations: Not on file     Relationship status: Not on file    Intimate partner violence:     Fear of current or ex partner: Not on file     Emotionally abused: Not on file     Physically abused: Not on file     Forced sexual activity: Not on file   Other Topics Concern    Not on file   Social History Narrative    ** Merged History Encounter **              ALLERGIES: Motrin [ibuprofen] and Tylenol [acetaminophen]    Review of Systems   HENT: Positive for dental problem. All other systems reviewed and are negative. Vitals:    01/08/20 1254   BP: 135/86   Pulse: 88   Resp: 18   Temp: 98 °F (36.7 °C)   SpO2: 99%   Weight: 76.2 kg (168 lb)   Height: 5' 3\" (1.6 m)            Physical Exam  Vitals signs and nursing note reviewed. Constitutional:       General: She is not in acute distress. Appearance: She is well-developed. She is not diaphoretic. HENT:      Head: Normocephalic and atraumatic. Right Ear: Tympanic membrane normal.      Left Ear: Tympanic membrane normal.      Mouth/Throat:      Comments: Broken left upper tooth, patient has had all molars removed. Swelling to the left cheek area of of abscess. No fluctuant areas noted to the gum  Eyes:      Pupils: Pupils are equal, round, and reactive to light. Neck:      Musculoskeletal: Normal range of motion and neck supple. Cardiovascular:      Rate and Rhythm: Normal rate and regular rhythm. Pulmonary:      Effort: Pulmonary effort is normal.      Breath sounds: Normal breath sounds. Abdominal:      General: Bowel sounds are normal.      Palpations: Abdomen is soft. Musculoskeletal: Normal range of motion. Skin:     General: Skin is warm. Neurological:      Mental Status: She is alert and oriented to person, place, and time.           MDM  Number of Diagnoses or Management Options  Diagnosis management comments: Dental abscess  Pt given 1 gm rocephin  in er  rx for pcn and ultram  Stressed to go to free dental clinic in am, return to er if symptoms worsen        Amount and/or Complexity of Data Reviewed  Review and summarize past medical records: yes    Risk of Complications, Morbidity, and/or Mortality  Presenting problems: low  Diagnostic procedures: low  Management options: low    Patient Progress  Patient progress: improved         Procedures

## 2020-01-08 NOTE — DISCHARGE INSTRUCTIONS
Use meds as directed, start today, go to dental clinic asap for recheck, return to er if symptoms worsen

## 2020-01-08 NOTE — ED TRIAGE NOTES
Pt has abscess on left side inside mouth. Pt noticed swelling two days ago.  Cheek is visibly swollen